# Patient Record
Sex: FEMALE | Race: WHITE | URBAN - METROPOLITAN AREA
[De-identification: names, ages, dates, MRNs, and addresses within clinical notes are randomized per-mention and may not be internally consistent; named-entity substitution may affect disease eponyms.]

---

## 2018-09-13 ENCOUNTER — OFFICE VISIT (OUTPATIENT)
Dept: OTHER | Facility: OUTPATIENT CENTER | Age: 23
End: 2018-09-13
Payer: COMMERCIAL

## 2018-09-13 VITALS
BODY MASS INDEX: 33.49 KG/M2 | DIASTOLIC BLOOD PRESSURE: 71 MMHG | HEIGHT: 62 IN | WEIGHT: 182 LBS | SYSTOLIC BLOOD PRESSURE: 107 MMHG | HEART RATE: 100 BPM

## 2018-09-13 DIAGNOSIS — F41.9 ANXIETY: ICD-10-CM

## 2018-09-13 DIAGNOSIS — Z78.9 ALCOHOL CONSUMPTION HEAVY: ICD-10-CM

## 2018-09-13 DIAGNOSIS — F52.31 FEMALE ORGASMIC DISORDER: ICD-10-CM

## 2018-09-13 DIAGNOSIS — R68.82 LOW LIBIDO: ICD-10-CM

## 2018-09-13 DIAGNOSIS — F33.1 MODERATE EPISODE OF RECURRENT MAJOR DEPRESSIVE DISORDER (H): ICD-10-CM

## 2018-09-13 ASSESSMENT — ANXIETY QUESTIONNAIRES
1. FEELING NERVOUS, ANXIOUS, OR ON EDGE: NEARLY EVERY DAY
6. BECOMING EASILY ANNOYED OR IRRITABLE: NEARLY EVERY DAY
GAD7 TOTAL SCORE: 20
2. NOT BEING ABLE TO STOP OR CONTROL WORRYING: NEARLY EVERY DAY
3. WORRYING TOO MUCH ABOUT DIFFERENT THINGS: NEARLY EVERY DAY
7. FEELING AFRAID AS IF SOMETHING AWFUL MIGHT HAPPEN: MORE THAN HALF THE DAYS
5. BEING SO RESTLESS THAT IT IS HARD TO SIT STILL: NEARLY EVERY DAY

## 2018-09-13 ASSESSMENT — PATIENT HEALTH QUESTIONNAIRE - PHQ9: 5. POOR APPETITE OR OVEREATING: NEARLY EVERY DAY

## 2018-09-13 NOTE — NURSING NOTE
"Chief Complaint   Patient presents with     Consult     Female Sexual Dysfu       Vitals:    09/13/18 1545   BP: 107/71   Pulse: 100   Weight: 82.6 kg (182 lb)   Height: 1.575 m (5' 2\")       Body mass index is 33.29 kg/(m^2).      Sariah Brooks CMA    "

## 2018-09-13 NOTE — MR AVS SNAPSHOT
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      Who to contact     Please call your clinic at [phone] to:    Ask questions about your health    Make or cancel appointments    Discuss your medicines    Learn about your test results    Speak to your doctor            Additional Information About Your Visit        MyChart Information     [employer] is an electronic gateway that provides easy, online access to your medical records. With [employer], you can request a clinic appointment, read your test results, renew a prescription or communicate with your care team.     To sign up for [employer] visit the website at [email or web address]   You will be asked to enter the access code listed below, as well as some personal information. Please follow the directions to create your username and password.     Your access code is: [email or web address]  Expires: 2018  8:01 AM     Your access code will  in 90 days. If you need help or a new code, please contact your [facility] or call [phone] for assistance.        Care EveryWhere ID     This is your Care EveryWhere ID. This could be used by other organizations to access your [facility] medical records  [id number]        Your Vitals Were     Pulse Height Last Period BMI (Body Mass Index)          100 1.575 m (5' 2\") 2018 (Approximate) 33.29 kg/m2         Blood Pressure from Last 3 Encounters: "   09/13/18 107/71    Weight from Last 3 Encounters:   09/13/18 82.6 kg (182 lb)              Today, you had the following     No orders found for display       Primary Care Provider Office Phone # Fax #    Claudia Kingsley PA-C [phone]       [address] [place]  [address]   [facility] [address]        Equal Access to Services     [facility]: Hadii aad ku hadasho Soomaali, waaxda luqadaha, qaybta kaalmada adeegyada, waxay idiin hayaan adeeg kharash la'aan . So [facility] [phone].    ATENCIÓN: Si habla español, tiene a barrera disposición servicios gratuitos de asistencia lingüística. Llame al [phone].    We comply with applicable federal civil rights laws and [place] laws. We do not discriminate on the basis of race, color, national origin, age, disability, sex, sexual orientation, or gender identity.            Thank you!     Thank you for choosing [facility]  for your care. Our goal is always to provide you with excellent care. Hearing back from our patients is one way we can continue to improve our services. Please take a few minutes to complete the written survey that you may receive in the mail after your visit with us. Thank you!             Your Updated Medication List - Protect others around you: Learn how to safely use, store and throw away your medicines at www.disposemymeds.org.      Notice  As of 9/13/2018 11:59 PM    You have not been prescribed any medications.

## 2018-09-21 PROBLEM — F33.1 MODERATE EPISODE OF RECURRENT MAJOR DEPRESSIVE DISORDER (H): Status: ACTIVE | Noted: 2018-09-21

## 2018-09-21 PROBLEM — Z78.9 ALCOHOL CONSUMPTION HEAVY: Status: ACTIVE | Noted: 2018-09-21

## 2018-09-21 PROBLEM — R68.82 LOW LIBIDO: Status: ACTIVE | Noted: 2018-09-21

## 2018-09-21 PROBLEM — F41.9 ANXIETY: Status: ACTIVE | Noted: 2018-09-21

## 2018-09-21 PROBLEM — F52.31 FEMALE ORGASMIC DISORDER: Status: ACTIVE | Noted: 2018-09-21

## 2018-09-21 PROBLEM — F33.2 SEVERE EPISODE OF RECURRENT MAJOR DEPRESSIVE DISORDER, WITHOUT PSYCHOTIC FEATURES (H): Status: ACTIVE | Noted: 2018-09-21
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
## 2018-09-24 RX ORDER — FLUTICASONE PROPIONATE 50 MCG
1 SPRAY, SUSPENSION (ML) NASAL DAILY
COMMUNITY

## 2018-09-24 RX ORDER — CETIRIZINE HYDROCHLORIDE 10 MG/1
10 TABLET ORAL
COMMUNITY

## 2018-09-24 RX ORDER — PANTOPRAZOLE SODIUM 40 MG/1
40 TABLET, DELAYED RELEASE ORAL 2 TIMES DAILY
COMMUNITY

## 2018-09-25 ASSESSMENT — ANXIETY QUESTIONNAIRES: GAD7 TOTAL SCORE: 20

## 2018-09-25 ASSESSMENT — PATIENT HEALTH QUESTIONNAIRE - PHQ9: SUM OF ALL RESPONSES TO PHQ QUESTIONS 1-9: 25

## 2020-11-07 ENCOUNTER — HOSPITAL ENCOUNTER (EMERGENCY)
Age: 25
Discharge: HOME | End: 2020-11-07

## 2020-11-07 VITALS
HEIGHT: 62 IN | SYSTOLIC BLOOD PRESSURE: 146 MMHG | BODY MASS INDEX: 34.96 KG/M2 | TEMPERATURE: 98.4 F | DIASTOLIC BLOOD PRESSURE: 98 MMHG | WEIGHT: 190 LBS | HEART RATE: 88 BPM | RESPIRATION RATE: 16 BRPM | OXYGEN SATURATION: 99 %

## 2020-11-07 DIAGNOSIS — S61.459A CAT BITE OF HAND, UNSPECIFIED LATERALITY, INITIAL ENCOUNTER: Primary | ICD-10-CM
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
## 2020-11-07 DIAGNOSIS — Z23 NEED FOR IMMUNIZATION AGAINST RABIES: ICD-10-CM

## 2020-11-07 PROCEDURE — 90471 IMMUNIZATION ADMIN: CPT | Performed by: PHYSICIAN ASSISTANT

## 2020-11-07 PROCEDURE — 90675 RABIES VACCINE IM: CPT | Performed by: PHYSICIAN ASSISTANT

## 2020-11-07 PROCEDURE — 90399 UNLISTED IMMUNE GLOBULIN: CPT | Performed by: PHYSICIAN ASSISTANT

## 2020-11-07 PROCEDURE — 99284 EMERGENCY DEPT VISIT MOD MDM: CPT | Performed by: PHYSICIAN ASSISTANT

## 2020-11-07 PROCEDURE — 10002800 HB RX 250 W HCPCS: Performed by: PHYSICIAN ASSISTANT

## 2020-11-07 PROCEDURE — 10002803 HB RX 637: Performed by: PHYSICIAN ASSISTANT

## 2020-11-07 PROCEDURE — 96372 THER/PROPH/DIAG INJ SC/IM: CPT | Performed by: PHYSICIAN ASSISTANT

## 2020-11-07 PROCEDURE — 90472 IMMUNIZATION ADMIN EACH ADD: CPT | Performed by: PHYSICIAN ASSISTANT

## 2020-11-07 PROCEDURE — 99283 EMERGENCY DEPT VISIT LOW MDM: CPT

## 2020-11-07 PROCEDURE — 90375 RABIES IG IM/SC: CPT | Performed by: PHYSICIAN ASSISTANT

## 2020-11-07 RX ORDER — DIAZEPAM 5 MG/1
5 TABLET ORAL EVERY 6 HOURS PRN
COMMUNITY

## 2020-11-07 RX ORDER — KETOROLAC TROMETHAMINE 10 MG/1
10 TABLET, FILM COATED ORAL EVERY 6 HOURS PRN
COMMUNITY

## 2020-11-07 RX ORDER — PANTOPRAZOLE SODIUM 20 MG/1
20 TABLET, DELAYED RELEASE ORAL DAILY
COMMUNITY

## 2020-11-07 RX ORDER — UBIDECARENONE 75 MG
50 CAPSULE ORAL DAILY
COMMUNITY

## 2020-11-07 RX ORDER — SUMATRIPTAN 25 MG/1
25 TABLET, FILM COATED ORAL PRN
COMMUNITY

## 2020-11-07 RX ORDER — AMOXICILLIN AND CLAVULANATE POTASSIUM 875; 125 MG/1; MG/1
1 TABLET, FILM COATED ORAL 2 TIMES DAILY
Qty: 10 TABLET | Refills: 0 | Status: SHIPPED | OUTPATIENT
Start: 2020-11-07 | End: 2020-11-12

## 2020-11-07 RX ORDER — ONDANSETRON 4 MG/1
4 TABLET, FILM COATED ORAL EVERY 8 HOURS PRN
COMMUNITY

## 2020-11-07 RX ORDER — ROPINIROLE 1 MG/1
1 TABLET, FILM COATED ORAL 3 TIMES DAILY
COMMUNITY

## 2020-11-07 RX ORDER — AMOXICILLIN AND CLAVULANATE POTASSIUM 875; 125 MG/1; MG/1
1 TABLET, FILM COATED ORAL ONCE
Status: COMPLETED | OUTPATIENT
Start: 2020-11-07 | End: 2020-11-07

## 2020-11-07 RX ORDER — DEXTROAMPHETAMINE SACCHARATE, AMPHETAMINE ASPARTATE, DEXTROAMPHETAMINE SULFATE AND AMPHETAMINE SULFATE 7.5; 7.5; 7.5; 7.5 MG/1; MG/1; MG/1; MG/1
30 TABLET ORAL DAILY
COMMUNITY

## 2020-11-07 RX ADMIN — RABIES IMMUNE GLOBULIN (HUMAN) 1500 UNITS: 150 INJECTION INTRAMUSCULAR at 21:20

## 2020-11-07 RX ADMIN — RABIES VACCINE 1 ML: KIT at 21:09

## 2020-11-07 RX ADMIN — RABIES IMMUNE GLOBULIN (HUMAN) 210 UNITS: 300 INJECTION, SOLUTION INFILTRATION; INTRAMUSCULAR at 21:18

## 2020-11-07 RX ADMIN — AMOXICILLIN AND CLAVULANATE POTASSIUM 1 TABLET: 875; 125 TABLET, FILM COATED ORAL at 20:53

## 2020-11-07 ASSESSMENT — PAIN SCALES - GENERAL: PAINLEVEL_OUTOF10: 0

## 2020-11-11 ENCOUNTER — CASE MANAGEMENT (OUTPATIENT)
Dept: OCCUPATIONAL MEDICINE | Age: 25
End: 2020-11-11

## 2020-11-14 ASSESSMENT — ENCOUNTER SYMPTOMS: WOUND: 1

## 2025-06-30 ENCOUNTER — DOCUMENTATION ONLY (OUTPATIENT)
Dept: PSYCHIATRY | Facility: CLINIC | Age: 30
End: 2025-06-30

## 2025-06-30 NOTE — PROGRESS NOTES
[facility]  [address], [address]  [place] [address]        Medication History and Review            Kelly Pinon is a 29 year old single (never ) female who goes by Claudai and uses she/her pronouns, who will be seeing Dr. Gamez on 07/10/2025.          Care Team      PCP- Claudia Kingsley  Specialty Providers- none  Therapist- Jenaro Moreno at [facility]  Psychiatric Med Management Provider- Natalie Montero at [facility]  Other Mental Health Providers- , housing worker     Referred by:   Referred for evaluation of:  depression, bipolar         Current Medication:  levomilnacipran (FETZIMA) 80 MG 24 hr capsul  diazePAM (Valium) 5 MG 1-1.5 Tablets by mouth q 6 h for Anxiety , Sleep & Night Terrors  TRAZODONE HCL  50 mg   prazosin (Minipress) 1 MG capsule   paliperidone (Invega) 9 MG 24 hour tablet   HYDROXYZINE HCL  50 mg prn itching  OXcarbazepine (Trileptal) 300 MG (3) Tablets at  for Mood disorde  Pregabalin (Lyrica) 100 MG   topiramate (Topamax) 50 MG tablet    PROPRANOLOL HCL  60 mg  Zolpidem Tartrate (AMBIEN) 5 mg            Selective Serotonin Reuptake inhibitor:  Sertraline was tried for 2-3 months at 14 years old (not effective)       Serotonin - Noradrenaline  reuptake inhibitor:    Levomilnacipran was tried max dose 120 mg Adequate trial       Serotonin Modulator:    none    Noradrenaline and Dopamine reuptake inhibitor:    none      Tricyclic Antidepressants (TCA):    none      Tetracyclic Antidepressants:    Trazodone was tried max dose  mg      Monoamine Oxidase Inhibitor (MAOI):   none    Antipsychotics:   Paliperidone (Invega) 9 MG 24 hour tablet was tried     Stimulants:   AMPHETAMINE-DEXTROAMPHET ER 30 MG [id number] was tried       Benzodiazepines:   Valium was tried as needed       Miscellaneous:   OXcarbazepine (Trileptal) 900 mg was tried    Pregabalin (Lyrica) 100 MG capsule was tried  topiramate (Topamax) 50 MG tablet was  tried  prazosin (Minipress) 1 MG capsule was tried for Frightening Dreams   PROPRANOLOL HCL  60 mg was tried  Zolpidem Tartrate (AMBIEN) 5 mg was tried  vitamin D2, ergocalciferol, 1250 mcg (50,000 units) capsule Take 1,250 mcg one time a week was tried  folic acid 1 MG tablet was tried             Ketamine Treatment:   ECT trials: No     TMS trials:  No       Ketamine:  No          Other Reported Treatment for Depression and Related Mood Disorder History:    Medication management, Outpatient individual psychotherapy, [facility] ([facility]) Worker, and Case Management       PROVIDER:     Valentino Justin, APRN, CNP, DNP  [facility]  Interventional Psychiatry Program

## 2025-07-10 ENCOUNTER — OFFICE VISIT (OUTPATIENT)
Dept: PSYCHIATRY | Facility: CLINIC | Age: 30
End: 2025-07-10
Payer: MEDICARE

## 2025-07-10 VITALS
WEIGHT: 145 LBS | BODY MASS INDEX: 26.52 KG/M2 | DIASTOLIC BLOOD PRESSURE: 71 MMHG | TEMPERATURE: 97.3 F | OXYGEN SATURATION: 98 % | SYSTOLIC BLOOD PRESSURE: 104 MMHG | HEART RATE: 86 BPM

## 2025-07-10 DIAGNOSIS — F33.1 MAJOR DEPRESSIVE DISORDER, RECURRENT EPISODE, MODERATE (H): Primary | ICD-10-CM

## 2025-07-10 RX ORDER — OXCARBAZEPINE 300 MG/1
900 TABLET, FILM COATED ORAL AT BEDTIME
COMMUNITY
Start: 2024-07-02

## 2025-07-10 RX ORDER — INDOMETHACIN 50 MG/1
50 CAPSULE ORAL 2 TIMES DAILY WITH MEALS
COMMUNITY
Start: 2024-11-26

## 2025-07-10 RX ORDER — PRAZOSIN HYDROCHLORIDE 1 MG/1
1 CAPSULE ORAL AT BEDTIME
COMMUNITY
Start: 2024-07-03

## 2025-07-10 RX ORDER — ERGOCALCIFEROL 1.25 MG/1
1250 CAPSULE, LIQUID FILLED ORAL WEEKLY
COMMUNITY
Start: 2025-01-02

## 2025-07-10 RX ORDER — NICOTINE 21 MG/24HR
1 PATCH, TRANSDERMAL 24 HOURS TRANSDERMAL EVERY 24 HOURS
COMMUNITY
Start: 2025-06-03

## 2025-07-10 RX ORDER — POTASSIUM CHLORIDE 1500 MG/1
20 TABLET, EXTENDED RELEASE ORAL DAILY
COMMUNITY
Start: 2025-05-25

## 2025-07-10 RX ORDER — NABUMETONE 500 MG/1
500 TABLET, FILM COATED ORAL PRN
COMMUNITY
Start: 2025-06-18

## 2025-07-10 RX ORDER — DIAZEPAM 5 MG/1
5 TABLET ORAL PRN
COMMUNITY

## 2025-07-10 RX ORDER — OLANZAPINE 5 MG/1
5 TABLET, ORALLY DISINTEGRATING ORAL DAILY PRN
COMMUNITY

## 2025-07-10 RX ORDER — TOPIRAMATE 50 MG/1
60 TABLET, FILM COATED ORAL DAILY
COMMUNITY
Start: 2023-08-09

## 2025-07-10 RX ORDER — PREGABALIN 100 MG/1
100 CAPSULE ORAL 3 TIMES DAILY
COMMUNITY
Start: 2025-07-03

## 2025-07-10 NOTE — Clinical Note
Dick Rocha,  She came in with a diagnosis of bipolar depressed but I don't believe that is accurate.   1) Major depressive disorder, recurrent, severe -- Medications: Continue current outpatient psychotropic medications Might consider a trial of buprenorphine for SIB -- Psychotherapy: Continue regular individual psychotherapy  -- Procedures:              - Pt referred  for an acute series of rTMS             - Coil: F8 or H1 (first available, hisotry of migraine so F8 may be better tolerated - IV ketamine. Given medical history will have to get medical clearance to undergo ketamine infusions and also be clearer on her co-pays - In the future might benefot from VNS Therapy especially given that we have observed in some patients an improvement of mood diysregulation along with antidepressant effects. -- Referrals: None     2) Alcohol misuse Not ready to acknowledge her excessive drinking and the seriousness of her repeated ED visits for pancreatitis.

## 2025-07-10 NOTE — PROGRESS NOTES
" [facility]  [address], [address]  [place] [address]  New Patient Evaluation       Kelly Pinon is a 29 year old female who prefers the name Kelly and pronoun she, her.  PCP- Claudia Kingsley  Specialty Providers- none  Therapist- Jenaro Moreno at [facility]  Psychiatric Med Management Provider- Natalie Montero at [facility]  Other Mental Health Providers- , housing worker     Referred by:   Referred for evaluation of:  depression, bipolar      Chief Complaint                                                                                                       \"Everything is a task and hard to do\"      History of Present Illness                                                                                4, 4      Kelly Pinon is a 29 year old single (never ) female who goes by Claudia and uses she/her pronouns.     Kelly reports one lifetime episode(s) of depression and has a history of two hospitalizations     Kelly's first episode of depression started around age [age], during first grade.   Kelly reports that since then there has not been a period of at least two months with full resolution of symptoms.     Current psychosocial stressors include housing instability, chronic pain, recent hospitalization      Kelly is interested in Ketamine treatment.        Past diagnoses: MDD, anxiety, Bipolar affective disorder, borderline personality disorder, cannabis use disorder, alcohol-induced acute pancreatitis     Past medication trials: multiple trials     Hospitalizations: Yes: two      Commitment: No, Current Alfaro order: No     ECT trials: No     TMS trials:  No       Ketamine:  No     Suicide attempts: Yes - \"Close to double digits\"     Self-injurious behavior: Yes - started in elementary school     Aggressive or violent behavior: No     Past Outpatient Programs & Services  Medication management, Outpatient individual psychotherapy, Adult " "Rehabilitative Mental Health Services (ARMHS) Worker, and Case Management     Psych critical item history suicide attempt, active suicidal ideation, SIB, trauma hx, substance use: alcohol and cannabis, multiple psychotropic trials, 15 psych hospitalization (8 of which for suicide attempt, one with an ICU stay, last was 1 year ago), and major medical problems including ETOH induced pancreatitis, back pain, fibromyaligia, etc. Is on disability but unable to identify the primary reason behind it (diagnosis associated with it). Did half of a DBT program several years ago, recently restarted therapy 1.5 month ago (I am not a therapy person, but now I am really trying to get better)     Other mental health concerns discussed: anxiety, trauma, fidelia     Sleep study: Yes - diagnosed with apnea, uses an APAP \"most of the time\"     ADHD testing: Yes - takes adderall      ASD assessment:  Yes - inconclusive results, no determination     Current Outpatient Programs & Services  Medication management, Outpatient individual psychotherapy, Case Management, and housing worker       Psychiatric Review of Symptoms:   A. DEPRESSION  Past 2 Weeks:  low mood nearly every day, anhedonia most of the time, appetite change (decrease), difficulties with sleep, low energy, worthlessness and/or guilt, difficulty concentrating, thinking or making decisions, and suicidal ideation with plan, without intent     Past Episode:  low mood nearly every day, anhedonia most of the time, difficulties with sleep, low energy, worthlessness and/or guilt, difficulty concentrating, thinking or making decisions, and suicidal ideation with plan, with intent     B. SUICIDALITY:  Risk: High  Current suicidal ideation: Yes, denies a plan, and denies intent.      -reports 0% in response to \"How likely are to you to try to kill yourself within the next 3 months on a scale from 0-100%?\"  -reports current SIB/Self Injurious Behavior and reports past SIB     -reports " "\"double digit\" lifetime suicide attempts.  Notable risk factors for self-harm, suicide include previous suicide attempt, recent psych inpt stay, feels trapped, lives alone/ isolated, severe anxiety, severe insomnia, SIB, financial/legal stress, and on opiates.  However, risk is mitigated by describes a safety plan and future oriented.       C. JELANI/HYPOMANIA  Current Episode:  none     Past Episode:  elevated mood/energy, grandiosity, need less sleep, racing thoughts, increased drive, and risk taking     D. PANIC:  unprovoked anxiety/fear, peaking in < 10 minutes, provoked anxiety/fear, heart palpitations, tremors, SOB, GI distress, dizziness, fear of losing control or going crazy, and fear of dying     E. AGORAPHOBIA:  marked fear/anxiety in crowds, alone away from home, and traveling by bus, train, car or using public transportation because of fear that escape might be difficult or help might not be available;, almost always, and has a diagnosis of agoraphobia     F. SOCIAL ANXIETY:  Claudia reports she doesn't leave the house or put herself in social situations due to anxiety     G. OBSESSIVE-COMPULSIVE:  obsessions, compulsions, and behaviors like tapping, pacing, \"twitching\" that may be compulsions, but she is not certain     H. TRAUMA:  experienced traumatic event, re-experienced trauma, and persistent avoidance of recollections of trauma     I. ALCOHOL & J. NON-ALCOHOL:  See below     K. PSYCHOSIS:   did not assess due to time     L-M. EATING DISORDER: food restriction     N. GENERALIZED ANXIETY:  excessive anxiety or worry about several routine things, most days, with difficulty controlling worry, feel restless, keyed up or on edge, muscle tension, easily tired, weak or exhausted, difficulty concentrating or mind goes blank, irritability, and difficulty sleeping     O. RULE OUT MEDICAL, ORGANIC OR DRUG CAUSES FOR ALL DISORDERS  During any current disorder or past mood episode, patient reports:  A. Substance use " "or withdrawal: Yes  B. Medical illness: Yes     P. ANTISOCIAL PERSONALITY:  before age 14 - skipped school, ran away from home overnight, or stayed out against parents rules and since age 14 -  done illegal acts         Substance Use History     RECENT SUBSTANCE USE:     TOBACCO- cigarettes  CAFFEINE-  caries, not daily use  ALCOHOL- varies and volume/frequency may be related to stressors. \"5-6 drinks, a few times a week\"  CANNABIS- smokes occasionally, 1-3 puffs/day and then go months with no use            OTHER ILLICIT DRUGS- none     Past Use-   TOBACCO- cigarettes  CAFFEINE-  varies, not daily use  ALCOHOL- more stress= more use, \"more than I should\"  CANNABIS- smokes occasionally, 1-3 puffs/day and then go months with no use            OTHER ILLICIT DRUGS- none     CD Treatment history: No  Medical consequences due to use (eg HIV/Hepatitis)- Yes - chart review note several ED visits and pancreatitis attributed to alcohol intake  Legal consequences due to use- No     Psychiatric Medication Trials     Current Medication:  levomilnacipran (FETZIMA) 80 MG 24 hr capsul  diazePAM (Valium) 5 MG 1-1.5 Tablets by mouth q 6 h for Anxiety , Sleep & Night Terrors  TRAZODONE HCL  50 mg   prazosin (Minipress) 1 MG capsule   paliperidone (Invega) 9 MG 24 hour tablet   HYDROXYZINE HCL  50 mg prn itching  OXcarbazepine (Trileptal) 300 MG (3) Tablets at HS for Mood disorde  Pregabalin (Lyrica) 100 MG   topiramate (Topamax) 50 MG tablet    PROPRANOLOL HCL  60 mg  Zolpidem Tartrate (AMBIEN) 5 mg    Selective Serotonin Reuptake inhibitor:  Sertraline was tried for 2-3 months at 14 years old (not effective)     Serotonin - Noradrenaline  reuptake inhibitor:    Levomilnacipran was tried max dose 120 mg Adequate trial     Serotonin Modulator:    none     Noradrenaline and Dopamine reuptake inhibitor:    none     Tricyclic Antidepressants (TCA):    none     Tetracyclic Antidepressants:    Trazodone was tried max dose  mg   "   Monoamine Oxidase Inhibitor (MAOI):   none     Antipsychotics:   Paliperidone (Invega) 9 MG 24 hour tablet was tried     Stimulants:   AMPHETAMINE-DEXTROAMPHET ER 30 MG [id number] was tried     Benzodiazepines:   Valium was tried as needed     Miscellaneous:   OXcarbazepine (Trileptal) 900 mg was tried    Pregabalin (Lyrica) 100 MG capsule was tried  topiramate (Topamax) 50 MG tablet was tried  prazosin (Minipress) 1 MG capsule was tried for Frightening Dreams   PROPRANOLOL HCL  60 mg was tried  Zolpidem Tartrate (AMBIEN) 5 mg was tried  vitamin D2, ergocalciferol, 1250 mcg (50,000 units) capsule Take 1,250 mcg one time a week was tried  folic acid 1 MG tablet was tried     Ketamine Treatment:   ECT trials: No     TMS trials:  No       Ketamine:  No    Other Reported Treatment for Depression and Related Mood Disorder History:    Medication management, Outpatient individual psychotherapy, Adult Rehabilitative Mental Health Services (ARMHS) Worker, and Case Management     Social/ Family History               [per patient report]                                                 1ea, 1ea     Kelly Pinon is a 29 year old single (never ) Black female with a psychiatric history of depression, fidelia, trauma, and anxiety who presents for [facility] Treatment Resistant Depression program evaluation. Patient was referred by her .      Living situation: Kelly lives alone in a Private Residence.   Kids? No  Pets? No  Guns, weapons, or other means to harm oneself in the home? No                  Education: Kelly s highest level of education is graduated early from high school      Occupation: Kelly is currently not working     Finances: Kelly is financial supported by SSDI, Social Security Disability Income     Relationships (kid/grandkids, spouse/partner, friends, support people): Specific Relationships & Quality of Relationship: Claudia reports limited support. She reports that the person who takes care of her  "dogs is a great help. Her dogs are her biggest comfort.     Spiritual considerations: No     Legal History: No     Trauma/Abuse History: Yes - ACE. Claudia endorses symptoms in criteria A and B on the MINI. Following trauma-informed best practice, additional details were not requested or discussed.      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      Medical Review of Systems                                                                                                 2, 10     Alertness: alert  and oriented  Appearance: casually groomed  Behavior/Demeanor: cooperative, pleasant, and guarded, with fair  eye contact   Speech: normal  Language: intact  Psychomotor: restless and agitated  Mood: depressed and anxious  Affect: restricted and blunted; was congruent to mood; was congruent to content  Thought Process/Associations: unremarkable  Thought Content:  Reports suicidal ideation with plan; without intent [details in Interim History];  Denies violent ideation  Perception:  Reports: none;  Denies: auditory hallucinations and visual hallucinations  Insight: fair  Judgment: adequate for safety  Cognition: does appear grossly intact; formal cognitive testing was not done       Metals Screen   Yes No Item     ? Implanted or lodged metals in body     no Implanted surgical devices     no Metal containing facial or scalp tattoos     no Non removable piercings   Seizure Screen  Yes No Item     " no Current Seizure Disorder?     no History of Seizure?     Does patient have a cochlear implant? __no________  Does patient have any shunts?___no________  Does patient have a pacemaker?___no_______  Does patient have a vagus nerve stimulator?___no_______  Does patient have a deep brain stimulator?___no_______  Any other implanted device?___no_____________       Allergy   Patient has no known allergies.     Current Medications     Current Outpatient Medications   Medication Sig Dispense Refill    Amphetamine-Dextroamphetamine (ADDERALL PO) Take 50 mg by mouth daily      cetirizine (ZYRTEC) 10 MG tablet Take 10 mg by mouth      etonogestrel (IMPLANON/NEXPLANON) 68 MG IMPL 1 each by Subdermal route once      fluticasone (FLONASE) 50 MCG/ACT spray Spray 1 spray into both nostrils daily      Fluticasone Propionate, Inhal, (FLOVENT IN)       FOLIC ACID PO Take 1 mg by mouth      HYDROXYZINE HCL PO Take 50 mg by mouth as needed for itching      levomilnacipran (FETZIMA) 80 MG 24 hr capsule Take 80 mg by mouth daily      Ondansetron HCl (ZOFRAN PO) Take 4 mg by mouth as needed for nausea or vomiting      pantoprazole (PROTONIX) 40 MG EC tablet Take 40 mg by mouth 2 times daily      PROPRANOLOL HCL PO Take 60 mg by mouth daily      SUMATRIPTAN SUCCINATE PO Take 25 mg by mouth as needed for migraine      TRAZODONE HCL PO Take 50 mg by mouth as needed for sleep      Zolpidem Tartrate (AMBIEN PO) Take 5 mg by mouth as needed for sleep          Vitals                                                                                                                        3, 3     There were no vitals taken for this visit.      Mental Status Exam                                                                                   9, 14 cog gs     Alertness: alert  and oriented  Appearance: casually groomed  Behavior/Demeanor: cooperative, pleasant, and guarded, with fair  eye contact   Speech: normal  Language: intact  Psychomotor:  restless and agitated  Mood: depressed and anxious  Affect: restricted and blunted; was congruent to mood; was congruent to content  Thought Process/Associations: unremarkable  Thought Content:  Reports suicidal ideation with plan; without intent [details in Interim History];  Denies violent ideation  Perception:  Reports: none;  Denies: auditory hallucinations and visual hallucinations  Insight: fair  Judgment: adequate for safety  Cognition: does appear grossly intact; formal cognitive testing was not done     Labs and Data       PHQ9 Today:  ?      9/13/2018     1:17 PM 6/9/2025     2:00 PM   PHQ   PHQ-9 Total Score 25  23   Q9: Thoughts of better off dead/self-harm past 2 weeks More than half the days  More than half the days       Data saved with a previous flowsheet row definition         No lab results found.  No lab results found.    Diagnosis and Assessment                                                                             m2, h3     Kelly Pinon is a 29 year old female with previous psychiatric history of MDD, recurrent, severe who presents for evaluation of depression and discussion of advanced therapeutic options. Diagnostically, she meet criteria for major depression recurrent, chronic and moderate, generalized anxiety disorder, borderline personality disorder and history of trauma and unstable home environment growing up. I don't believe at this time, I have the information need to confirm a diagnosis of BPAD.     She has a well documented failure of adequate trials of >= 4 antidepressants which represent multiple antidepressant classes as well as augmentation therapies. The patient has completed an adequate dose of individual psychotherapy.     Patient is burdened by her chronic symptoms of depression and her current episode has lasted since adolescence causing significant psychosocial dysfunction despite multiple trials of psychotropic medications and individual therapy. Due to  remaining profound depression and numerous failed previous treatment modalities, the patient is a candidate for . TMS and IV ketamine    The risks, benefits, alternatives and potential adverse effects have been explained and are understood by the patient. Kelly Pinon agrees to the treatment plan with the ability to do so. The pt knows to call the clinic for any problems or access emergency care if needed.   Medical considerations relevant to treatment, as noted above, include pancreatitis and chronic pain.   Substance concerns relevant to treatment inlcude alocohol.       During the course of these treatments, the patient will be asked not to make any medication changes.   While waiting to initiate these treatments, it is absolutely reasonable to make medication changes, and suggestions (if any) are below.  After treatment is complete, the patient will transfer back to the referring provider.     Suicide Risk Assessment:  Today Kelly Pinon reports chronic suicidal ideation but none today. She has notable risk factors for self-harm, including lives alone/ isolated, SIB, substance use / pending treatment, and ~15 hospitalizations. However, risk is mitigated by no plan or intent, no access to lethal means, and future oriented. Therefore, based on all available evidence including the factors cited above, she does not appear to be at imminent risk for self-harm, does not meet criteria for a 72-hr hold, and therefore involuntary hospitalization will not be pursued at this  time. However, based on degree of symptoms, voluntary referral for outpatient program or DBT was recommended, she accepted this offer.     Today the following issues were addressed:  1- Major depressive disorder, chronic moderate (vs history of bipolar disorder, currently depressed)  2- Alochol misuse, active  3- Generalized anxiety disorder    Agoraphobia, by history  PTSD, by history     Clinical Global Impression, Severity of Illness  (1-7): 4  Clinical Global Impression, Improvement (1-7): N/A    [place] Prescription Monitoring Program [] review was not needed today.    PSYCHOTROPIC DRUG INTERACTIONS: none clinically relevant    Plan                                                                                                                     m2, h3     1) Major depressive disorder, recurrent, severe  -- Medications: Continue current outpatient psychotropic medications  Might consider a trial of buprenorphine for SIB  -- Psychotherapy: Continue regular individual psychotherapy   -- Procedures:    - Pt referred  for an acute series of rTMS   - Coil: F8 or H1 (first available, hisotry of migraine so F8 may be better tolerated  - IV ketamine. Given medical history will have to get medical clearance to undergo ketamine infusions and also be clearer on her co-pays  - In the future might benefot from VNS Therapy especially given that we have observed in some patients an improvement of mood diysregulation along with antidepressant effects.  -- Referrals: None      2) Alcohol misuse  Not ready to acknowledge her excessive drinking and the seriousness of her repeated ED visits for pancreatitis.     3) MEGHAN and agarophobia  Stressedf the importance of BA and the need to work on getting out of the house, walk her dogs, pursue goal directed activities (modeling, classes, etc)    RTC: As needed based on what she decides to do after discussing with her therapist    CRISIS NUMBERS:   Provided routinely in AVS.    Treatment Risk Statement:  The patient understands the risks, benefits, adverse effects and alternatives. Agrees to treatment with the capacity to do so. No medical contraindications to treatment. Agrees to call clinic for any problems. The patient understands to call 911 or go to the nearest ED if life threatening or urgent symptoms occur.     WHODAS 2.0  TODAY total score = N/A; [a 12-item WHODAS 2.0 assessment was not completed by the pt today  and/or recorded in EPIC].       PROVIDER:  Yomaira Gamez MD    Time based billing.  Time on day of service includes:  60min spent face to face with the patient   45 minutes pre-reviewing records  30 minutes preparing consultation note and follow up messages/documentation    :[id number]}  The longitudinal plan of care for the diagnosis(es)/condition(s) as documented were addressed during this visit. Due to the added complexity in care, I will continue to support Claudiasabasasaf in the subsequent management and with ongoing continuity of care.         Yomaira Gamez MD

## 2025-07-10 NOTE — Clinical Note
What is the  process for getting her medically cleared to undergo ketamine? SHe has a history of pancreatitis (alcohol misuse)

## 2025-07-15 ENCOUNTER — TELEPHONE (OUTPATIENT)
Dept: PSYCHIATRY | Facility: CLINIC | Age: 30
End: 2025-07-15

## 2025-07-19 ENCOUNTER — HEALTH MAINTENANCE LETTER (OUTPATIENT)
Age: 30
End: 2025-07-19

## 2025-08-04 ENCOUNTER — LAB (OUTPATIENT)
Dept: LAB | Facility: CLINIC | Age: 30
End: 2025-08-04
Payer: MEDICARE

## 2025-08-04 DIAGNOSIS — F33.1 MAJOR DEPRESSIVE DISORDER, RECURRENT EPISODE, MODERATE (H): ICD-10-CM

## 2025-08-04 LAB
BASOPHILS # BLD AUTO: 0 10E3/UL (ref 0–0.2)
BASOPHILS NFR BLD AUTO: 1 %
EOSINOPHIL # BLD AUTO: 0.1 10E3/UL (ref 0–0.7)
EOSINOPHIL NFR BLD AUTO: 2 %
ERYTHROCYTE [DISTWIDTH] IN BLOOD BY AUTOMATED COUNT: 11.5 % (ref 10–15)
HCT VFR BLD AUTO: 37 % (ref 35–47)
HGB BLD-MCNC: 13.4 G/DL (ref 11.7–15.7)
IMM GRANULOCYTES # BLD: 0 10E3/UL
IMM GRANULOCYTES NFR BLD: 0 %
LYMPHOCYTES # BLD AUTO: 2.1 10E3/UL (ref 0.8–5.3)
LYMPHOCYTES NFR BLD AUTO: 29 %
MCH RBC QN AUTO: 36.2 PG (ref 26.5–33)
MCHC RBC AUTO-ENTMCNC: 36.2 G/DL (ref 31.5–36.5)
MCV RBC AUTO: 100 FL (ref 78–100)
MONOCYTES # BLD AUTO: 0.5 10E3/UL (ref 0–1.3)
MONOCYTES NFR BLD AUTO: 7 %
NEUTROPHILS # BLD AUTO: 4.7 10E3/UL (ref 1.6–8.3)
NEUTROPHILS NFR BLD AUTO: 63 %
PLATELET # BLD AUTO: 186 10E3/UL (ref 150–450)
RBC # BLD AUTO: 3.7 10E6/UL (ref 3.8–5.2)
WBC # BLD AUTO: 7.5 10E3/UL (ref 4–11)

## 2025-08-04 PROCEDURE — G0480 DRUG TEST DEF 1-7 CLASSES: HCPCS | Mod: 90

## 2025-08-04 PROCEDURE — 85025 COMPLETE CBC W/AUTO DIFF WBC: CPT

## 2025-08-04 PROCEDURE — 80053 COMPREHEN METABOLIC PANEL: CPT

## 2025-08-04 PROCEDURE — 36415 COLL VENOUS BLD VENIPUNCTURE: CPT

## 2025-08-05 LAB
ALBUMIN SERPL BCG-MCNC: 4.7 G/DL (ref 3.5–5.2)
ALP SERPL-CCNC: 186 U/L (ref 40–150)
ALT SERPL W P-5'-P-CCNC: 152 U/L (ref 0–50)
ANION GAP SERPL CALCULATED.3IONS-SCNC: 18 MMOL/L (ref 7–15)
AST SERPL W P-5'-P-CCNC: 392 U/L (ref 0–45)
BILIRUB SERPL-MCNC: 0.7 MG/DL
BUN SERPL-MCNC: 9.9 MG/DL (ref 6–20)
CALCIUM SERPL-MCNC: 10.1 MG/DL (ref 8.8–10.4)
CHLORIDE SERPL-SCNC: 102 MMOL/L (ref 98–107)
CREAT SERPL-MCNC: 0.48 MG/DL (ref 0.51–0.95)
EGFRCR SERPLBLD CKD-EPI 2021: >90 ML/MIN/1.73M2
GLUCOSE SERPL-MCNC: 173 MG/DL (ref 70–99)
HCO3 SERPL-SCNC: 17 MMOL/L (ref 22–29)
POTASSIUM SERPL-SCNC: 3.7 MMOL/L (ref 3.4–5.3)
PROT SERPL-MCNC: 7.8 G/DL (ref 6.4–8.3)
SODIUM SERPL-SCNC: 137 MMOL/L (ref 135–145)

## 2025-08-06 LAB
LABORATORY REPORT: NORMAL
PETH INTERPRETATION: NORMAL
PLPETH BLD-MCNC: 877 NG/ML
POPETH BLD-MCNC: 1131 NG/ML